# Patient Record
Sex: MALE | Race: WHITE | NOT HISPANIC OR LATINO | URBAN - NONMETROPOLITAN AREA
[De-identification: names, ages, dates, MRNs, and addresses within clinical notes are randomized per-mention and may not be internally consistent; named-entity substitution may affect disease eponyms.]

---
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
## 2023-11-17 NOTE — PROCEDURE: PHOTOTHERAPY: BLUE/RED LIGHT
Consent: Written consent obtained.  The risks were reviewed with the patient including but not limited to: pigmentary changes, discomfort, scabbing, and redness.
Illumination Time: 00:16:40
Post-Care Instructions: I reviewed with the patient in detail post-care instructions. Patient is to wear sun protection. Patients may expect sunburn like redness, discomfort and a temporary worsening of pimples.
Detail Level: Zone
Epidermal Closure: running